# Patient Record
Sex: FEMALE | Race: WHITE | NOT HISPANIC OR LATINO | Employment: OTHER | URBAN - NONMETROPOLITAN AREA
[De-identification: names, ages, dates, MRNs, and addresses within clinical notes are randomized per-mention and may not be internally consistent; named-entity substitution may affect disease eponyms.]

---

## 2020-10-14 ENCOUNTER — HISTORICAL (OUTPATIENT)
Dept: ADMINISTRATIVE | Facility: HOSPITAL | Age: 70
End: 2020-10-14

## 2020-12-02 ENCOUNTER — HISTORICAL (OUTPATIENT)
Dept: ADMINISTRATIVE | Facility: HOSPITAL | Age: 70
End: 2020-12-02

## 2020-12-03 LAB — GRAM STN SPEC: NORMAL

## 2020-12-05 LAB — FINAL CULTURE: NORMAL

## 2021-01-04 LAB — FINAL CULTURE: NORMAL

## 2021-12-08 LAB
BILIRUB SERPL-MCNC: NEGATIVE MG/DL
BLOOD URINE, POC: NORMAL
CLARITY, POC UA: CLEAR
COLOR, POC UA: YELLOW
GLUCOSE UR QL STRIP: NEGATIVE
KETONES UR QL STRIP: NEGATIVE
LEUKOCYTE EST, POC UA: NEGATIVE
NITRITE, POC UA: NEGATIVE
PH, POC UA: 5.5
PROTEIN, POC: NEGATIVE
SPECIFIC GRAVITY, POC UA: NORMAL
UROBILINOGEN, POC UA: NORMAL

## 2021-12-15 ENCOUNTER — HISTORICAL (OUTPATIENT)
Dept: ADMINISTRATIVE | Facility: HOSPITAL | Age: 71
End: 2021-12-15

## 2021-12-20 LAB
BILIRUB SERPL-MCNC: NEGATIVE MG/DL
BLOOD URINE, POC: NORMAL
CLARITY, POC UA: CLEAR
COLOR, POC UA: YELLOW
GLUCOSE UR QL STRIP: NEGATIVE
KETONES UR QL STRIP: NEGATIVE
LEUKOCYTE EST, POC UA: NEGATIVE
NITRITE, POC UA: NEGATIVE
PH, POC UA: 5
PROTEIN, POC: NEGATIVE
SPECIFIC GRAVITY, POC UA: 1.02
UROBILINOGEN, POC UA: NORMAL

## 2022-02-07 ENCOUNTER — HISTORICAL (OUTPATIENT)
Dept: ADMINISTRATIVE | Facility: HOSPITAL | Age: 72
End: 2022-02-07

## 2022-04-11 ENCOUNTER — HISTORICAL (OUTPATIENT)
Dept: ADMINISTRATIVE | Facility: HOSPITAL | Age: 72
End: 2022-04-11

## 2022-04-25 VITALS
HEIGHT: 64 IN | SYSTOLIC BLOOD PRESSURE: 124 MMHG | BODY MASS INDEX: 32.14 KG/M2 | WEIGHT: 188.25 LBS | DIASTOLIC BLOOD PRESSURE: 80 MMHG

## 2022-09-21 ENCOUNTER — HISTORICAL (OUTPATIENT)
Dept: ADMINISTRATIVE | Facility: HOSPITAL | Age: 72
End: 2022-09-21

## 2023-07-13 NOTE — PROGRESS NOTES
Chief Complaint:  Well Woman      History of Present Illness:  Nancy Andrade is a 72 y.o. year old  here for her annual exam. Menopausal, denies any postmenopausal bleeding. She is not having symptoms of menopause.       MENOPAUSAL:  Age at menarche: 14  Age at menopause: 52  Hysterectomy:  No  Last pap: 10/7/20 neg  H/o Abnormal Pap: No   Colposcopy: no  Postcoital Bleeding: No  Sexually Active: yes  Dyspareunia: No  H/o STI: No   HRT: none  MMG: 10/14/20 benign  H/o abnl MMG: no  Colonoscopy: cologuard ,done in  pt states everything as ok      Review of Systems:  General/Constitutional: Chills denies. Fatigue/weakness denies. Fever denies. Night sweats denies. Hot flashes denies    Respiratory: Cough denies. Hemoptysis denies. SOB denies. Sputum production denies. Wheezing denies .   Cardiovascular: Chest pain denies . Dizziness denies. Palpitations denies. Swelling in hands/feet denies    Gastrointestinal: Abdominal pain denies. Blood in stool denies. Constipation denies. Diarrhea denies. Heartburn denies. Nausea denies. Vomiting denies    Genitourinary: Incontinence denies. Blood in urine denies. Frequent urination denies. Painful urination denies. Urinary urgency denies. Nocturia denies    Gynecologic: Irregular menses denies. Heavy bleeding denies. Painful menses denies. Vaginal discharge denies. Vaginal odor denies. Vaginal itching denies. Vaginal lesion denies. Pelvic pain denies. Decreased libido denies. Vulvar lesion denies. Prolapse of genital organs denies. Painful intercourse denies. Postcoital bleeding denies    Psychiatric: Depression denies. Anxiety denies     OB History    Para Term  AB Living   1 1 1 0 0 1   SAB IAB Ectopic Multiple Live Births   0 0 0 0 1      # 1 - Date: [birth year], Sex: Female, Weight: 3.487 kg (7 lb 11 oz), GA: None, Delivery: Vaginal, Spontaneous, Apgar1: None, Apgar5: None, Living: Living, Birth Comments: None      Past Medical History:   Diagnosis Date     Retinal disease     Unspecified cataract      No current outpatient medications on file.    Review of patient's allergies indicates:  No Known Allergies    Past Surgical History:   Procedure Laterality Date    CHOLECYSTECTOMY  11/24/2015    DILATION AND CURETTAGE OF UTERUS      EXTRACTION OF CATARACT Right 08/16/2022    Dr. Eisenberg     Family History   Problem Relation Age of Onset    Breast cancer Mother         onset unknown    Colon cancer Neg Hx     Ovarian cancer Neg Hx     Uterine cancer Neg Hx     Cervical cancer Neg Hx      Social History     Socioeconomic History    Marital status:    Tobacco Use    Smoking status: Never     Passive exposure: Never    Smokeless tobacco: Never   Substance and Sexual Activity    Alcohol use: Not Currently    Drug use: Never       Physical Exam:  /80 (BP Location: Right arm, Patient Position: Sitting, BP Method: Medium (Manual))   Temp 97.3 °F (36.3 °C) (Temporal)   Wt 79 kg (174 lb 2.6 oz)   BMI 29.90 kg/m²     Chaperone: present.       General appearance: healthy, well-nourished and well-developed     Psychiatric: Orientation to time, place and person. Normal mood and affect and active, alert     Skin: Appearance: no rashes or lesions.     Neck:   Neck: supple, FROM, trachea midline. and no masses   Thyroid: no enlargement or nodules and non-tender.       Cardiovascular:   Auscultation: RRR and no murmur.   Peripheral Vascular: no varicosities, LLE edema, RLE edema, calf tenderness, and palpable cord and pedal pulses intact.     Lungs:   Respiratory effort: no intercostal retractions or accessory muscle usage.   Auscultation: no wheezing, rales/crackles, or rhonchi and clear to auscultation.     Breast:   Inspection/Palpation: no tenderness, discrete/distinct masses, skin changes, or abnormal secretions. Nipple appearance normal.     Abdomen:   Auscultation/Inspection/Palpation: no hepatomegaly, splenomegaly, masses, tenderness or CVA tenderness and soft,  non-distended bowel sounds preset.    Hernia: no palpable hernias.     Female Genitalia:    Vulva: no masses, tenderness or lesions    Bladder/Urethra: no urethral discharge or mass, normal meatus, bladder non-distended.    Vagina: no tenderness, erythema, cystocele, rectocele, abnormal vaginal discharge or vesicle(s) or ulcers    Cervix: no discharge, no cervical lacerations noted or motion tenderness and grossly normal    Uterus: normal size and shape and midline, non-tender, and no uterine prolapse.    Adnexa/Parametria: no parametrial tenderness or mass, no adnexal tenderness or ovarian mass.     Lymph Nodes:   Palpation: non tender submandibular nodes, axillary nodes, or inguinal nodes.     Rectal Exam:   Rectum: normal perianal skin.       Assessment/Plan:  1. Well woman exam  Pap   Advised patient if she notices any changes to her breasts, including a lump, mass, dimpling, discharge, rash or tenderness, to should contact us immediately  Healthy diet, exercise   MMG  Multivitamin   Seat belt   Sunscreen use   Safe sex/ STI education  Annual labs: w/ PCP,  C-scope: cologuard 2022, benign

## 2023-07-14 ENCOUNTER — OFFICE VISIT (OUTPATIENT)
Dept: OBSTETRICS AND GYNECOLOGY | Facility: CLINIC | Age: 73
End: 2023-07-14
Payer: MEDICARE

## 2023-07-14 VITALS
TEMPERATURE: 97 F | BODY MASS INDEX: 29.9 KG/M2 | WEIGHT: 174.19 LBS | DIASTOLIC BLOOD PRESSURE: 80 MMHG | SYSTOLIC BLOOD PRESSURE: 134 MMHG

## 2023-07-14 DIAGNOSIS — Z12.4 SCREENING FOR MALIGNANT NEOPLASM OF THE CERVIX: ICD-10-CM

## 2023-07-14 DIAGNOSIS — Z01.419 WELL WOMAN EXAM: Primary | ICD-10-CM

## 2023-07-14 PROCEDURE — G0101 CA SCREEN;PELVIC/BREAST EXAM: HCPCS | Mod: GZ,,, | Performed by: OBSTETRICS & GYNECOLOGY

## 2023-07-14 PROCEDURE — G0101 PR CA SCREEN;PELVIC/BREAST EXAM: ICD-10-PCS | Mod: GZ,,, | Performed by: OBSTETRICS & GYNECOLOGY

## 2023-07-18 LAB — PSYCHE PATHOLOGY RESULT: NORMAL

## 2023-07-27 ENCOUNTER — TELEPHONE (OUTPATIENT)
Dept: OBSTETRICS AND GYNECOLOGY | Facility: CLINIC | Age: 73
End: 2023-07-27
Payer: MEDICARE

## 2023-07-27 DIAGNOSIS — Z12.31 BREAST CANCER SCREENING BY MAMMOGRAM: Primary | ICD-10-CM

## 2023-07-27 NOTE — TELEPHONE ENCOUNTER
----- Message from Anum Louie sent at 7/27/2023  1:16 PM CDT -----  Regarding: Mammo Order  Contact: Nancy  Type:  Mammogram    Caller is requesting to schedule their annual mammogram appointment. Caller:Nancy  Where would they like the mammogram performed?[facility]  Best Call Back Number:[phone]   Additional Information: pt recently had annual appt. But did not get order

## 2023-08-03 ENCOUNTER — HOSPITAL ENCOUNTER (OUTPATIENT)
Dept: RADIOLOGY | Facility: HOSPITAL | Age: 73
Discharge: HOME | End: 2023-08-03
Attending: OBSTETRICS & GYNECOLOGY
Payer: MEDICARE

## 2023-08-03 VITALS — WEIGHT: 170 LBS | BODY MASS INDEX: 29.02 KG/M2 | HEIGHT: 64 IN

## 2023-08-03 DIAGNOSIS — Z12.31 BREAST CANCER SCREENING BY MAMMOGRAM: ICD-10-CM

## 2023-08-03 PROCEDURE — 77067 SCR MAMMO BI INCL CAD: CPT | Mod: TC
